# Patient Record
Sex: FEMALE | Race: WHITE | NOT HISPANIC OR LATINO | URBAN - METROPOLITAN AREA
[De-identification: names, ages, dates, MRNs, and addresses within clinical notes are randomized per-mention and may not be internally consistent; named-entity substitution may affect disease eponyms.]

---

## 2017-04-21 ENCOUNTER — APPOINTMENT (OUTPATIENT)
Dept: URBAN - METROPOLITAN AREA SURGERY 9 | Age: 67
Setting detail: DERMATOLOGY
End: 2017-04-21

## 2017-04-21 DIAGNOSIS — L82.1 OTHER SEBORRHEIC KERATOSIS: ICD-10-CM

## 2017-04-21 DIAGNOSIS — L91.8 OTHER HYPERTROPHIC DISORDERS OF THE SKIN: ICD-10-CM

## 2017-04-21 PROCEDURE — OTHER SKIN TAG REMOVAL MULTI (COSMETIC): OTHER

## 2017-04-21 PROCEDURE — OTHER REASSURANCE: OTHER

## 2017-04-21 PROCEDURE — OTHER COUNSELING: OTHER

## 2017-04-21 ASSESSMENT — LOCATION SIMPLE DESCRIPTION DERM
LOCATION SIMPLE: LEFT ANTERIOR NECK
LOCATION SIMPLE: LEFT BREAST
LOCATION SIMPLE: LEFT UPPER ARM

## 2017-04-21 ASSESSMENT — LOCATION DETAILED DESCRIPTION DERM
LOCATION DETAILED: LEFT INFERIOR LATERAL NECK
LOCATION DETAILED: LEFT MEDIAL BREAST 7-8:00 REGION
LOCATION DETAILED: LEFT ANTERIOR MEDIAL PROXIMAL UPPER ARM
LOCATION DETAILED: LEFT MEDIAL BREAST 6-7:00 REGION

## 2017-04-21 ASSESSMENT — LOCATION ZONE DERM
LOCATION ZONE: TRUNK
LOCATION ZONE: ARM
LOCATION ZONE: NECK

## 2017-04-21 NOTE — PROCEDURE: SKIN TAG REMOVAL MULTI (COSMETIC)
Detail Level: Simple
Price (Use Numbers Only, No Special Characters Or $): 50
Consent: Written consent obtained and the risks of skin tag removal was reviewed with the patient including but not limited to bleeding, pigmentary change, infection, pain, and remote possibility of scarring.
Total Number Of Lesions Treated: 4
Removed With: liquid nitrogen
Anesthesia Volume In Cc: 0

## 2018-11-07 ENCOUNTER — APPOINTMENT (OUTPATIENT)
Dept: URBAN - METROPOLITAN AREA SURGERY 9 | Age: 68
Setting detail: DERMATOLOGY
End: 2018-11-07

## 2018-11-07 DIAGNOSIS — L81.4 OTHER MELANIN HYPERPIGMENTATION: ICD-10-CM

## 2018-11-07 DIAGNOSIS — L82.1 OTHER SEBORRHEIC KERATOSIS: ICD-10-CM

## 2018-11-07 PROBLEM — D48.5 NEOPLASM OF UNCERTAIN BEHAVIOR OF SKIN: Status: ACTIVE | Noted: 2018-11-07

## 2018-11-07 PROCEDURE — OTHER REASSURANCE: OTHER

## 2018-11-07 PROCEDURE — 11100: CPT

## 2018-11-07 PROCEDURE — OTHER COUNSELING: OTHER

## 2018-11-07 PROCEDURE — OTHER PATHOLOGY BILLING: OTHER

## 2018-11-07 PROCEDURE — 99213 OFFICE O/P EST LOW 20 MIN: CPT | Mod: 25

## 2018-11-07 PROCEDURE — OTHER TREATMENT REGIMEN: OTHER

## 2018-11-07 PROCEDURE — OTHER BIOPSY BY SHAVE METHOD: OTHER

## 2018-11-07 PROCEDURE — 88305 TISSUE EXAM BY PATHOLOGIST: CPT | Mod: TC

## 2018-11-07 ASSESSMENT — LOCATION ZONE DERM
LOCATION ZONE: TRUNK
LOCATION ZONE: ARM
LOCATION ZONE: FACE

## 2018-11-07 ASSESSMENT — LOCATION DETAILED DESCRIPTION DERM
LOCATION DETAILED: RIGHT LATERAL DORSAL WRIST
LOCATION DETAILED: SUPERIOR LUMBAR SPINE
LOCATION DETAILED: LEFT INFERIOR CENTRAL MALAR CHEEK
LOCATION DETAILED: SUPERIOR THORACIC SPINE

## 2018-11-07 ASSESSMENT — LOCATION SIMPLE DESCRIPTION DERM
LOCATION SIMPLE: RIGHT WRIST
LOCATION SIMPLE: UPPER BACK
LOCATION SIMPLE: LOWER BACK
LOCATION SIMPLE: LEFT CHEEK

## 2018-11-07 NOTE — HPI: SECONDARY COMPLAINT
Additional History: She wants to know if we have an  at our office. She said she gets sunlight often so if they’ll just recur, she isn’t interested in treatment then.

## 2018-11-07 NOTE — PROCEDURE: PATHOLOGY BILLING
Immunohistochemistry ([id number] and [id number]) billing is not performed here. Please use the Immunohistochemistry Stain Billing plan to accomplish this. Immunohistochemistry ([id number] and [id number]) billing is not performed here. Please use the Immunohistochemistry Stain Billing plan to accomplish this.

## 2018-11-07 NOTE — PROCEDURE: TREATMENT REGIMEN
Detail Level: Simple
Plan: LN2, chemical peels, and laser additional options
Otc Regimen: Strict photoprotection. Recommend facial moisturizer with SPF daily. \\nCan try Ambi cream twice daily or consider Hydro-Q. Handout given

## 2021-07-16 ENCOUNTER — APPOINTMENT (OUTPATIENT)
Dept: URBAN - METROPOLITAN AREA SURGERY 9 | Age: 71
Setting detail: DERMATOLOGY
End: 2021-07-19

## 2021-07-16 DIAGNOSIS — L40.0 PSORIASIS VULGARIS: ICD-10-CM

## 2021-07-16 PROBLEM — L30.9 DERMATITIS, UNSPECIFIED: Status: ACTIVE | Noted: 2021-07-16

## 2021-07-16 PROCEDURE — OTHER PRESCRIPTION: OTHER

## 2021-07-16 PROCEDURE — OTHER PRESCRIPTION MEDICATION MANAGEMENT: OTHER

## 2021-07-16 PROCEDURE — 99214 OFFICE O/P EST MOD 30 MIN: CPT

## 2021-07-16 RX ORDER — CLOBETASOL PROPIONATE 0.5 MG/G
CREAM TOPICAL BID
Qty: 1 | Refills: 1 | Status: ERX | COMMUNITY
Start: 2021-07-16

## 2021-07-16 ASSESSMENT — LOCATION DETAILED DESCRIPTION DERM: LOCATION DETAILED: LEFT MEDIAL PLANTAR MIDFOOT

## 2021-07-16 ASSESSMENT — LOCATION ZONE DERM: LOCATION ZONE: FEET

## 2021-07-16 ASSESSMENT — LOCATION SIMPLE DESCRIPTION DERM: LOCATION SIMPLE: LEFT PLANTAR SURFACE

## 2021-07-16 NOTE — PROCEDURE: PRESCRIPTION MEDICATION MANAGEMENT
Continue Regimen: Kerasol if she would like
Render In Strict Bullet Format?: No
Detail Level: Zone
Initiate Treatment: Clobetasol as directed; [employer] Rough and Bumpy skin cream then occlude
Plan: Reviewed the difficulty in diagnosing these lesions, even with a biopsy. \\nWill have her d/c kerasol if not improved in the future. \\nPotential bx.\\nShe may have some scaling lesions around the webs of her fingers- states that she had more dermatitis in these areas as a child, but they have greatly improved over time.

## 2021-07-16 NOTE — HPI: BODY LOCATION - FEET
Additional History: Pt states she has tried many OTC fungal creams and Neosporin on this location. She states it waxes and wanes. Today is an average day for the fungus. It has not every appeared on the right foot. She is currently experiencing a lot of peeling